# Patient Record
Sex: MALE | Race: BLACK OR AFRICAN AMERICAN | NOT HISPANIC OR LATINO | URBAN - METROPOLITAN AREA
[De-identification: names, ages, dates, MRNs, and addresses within clinical notes are randomized per-mention and may not be internally consistent; named-entity substitution may affect disease eponyms.]

---

## 2017-10-05 ENCOUNTER — EMERGENCY (EMERGENCY)
Facility: HOSPITAL | Age: 32
LOS: 1 days | Discharge: HOME | End: 2017-10-05
Admitting: EMERGENCY MEDICINE
Payer: MEDICAID

## 2017-10-05 VITALS
TEMPERATURE: 99 F | OXYGEN SATURATION: 98 % | HEART RATE: 77 BPM | DIASTOLIC BLOOD PRESSURE: 71 MMHG | WEIGHT: 195.99 LBS | SYSTOLIC BLOOD PRESSURE: 124 MMHG | RESPIRATION RATE: 18 BRPM | HEIGHT: 65 IN
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
## 2017-10-05 DIAGNOSIS — Z23 ENCOUNTER FOR IMMUNIZATION: ICD-10-CM
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
## 2017-10-05 DIAGNOSIS — S61.214A LACERATION WITHOUT FOREIGN BODY OF RIGHT RING FINGER WITHOUT DAMAGE TO NAIL, INITIAL ENCOUNTER: ICD-10-CM

## 2017-10-05 PROCEDURE — 90471 IMMUNIZATION ADMIN: CPT

## 2017-10-05 PROCEDURE — 99053 MED SERV 10PM-8AM 24 HR FAC: CPT

## 2017-10-05 PROCEDURE — 12002 RPR S/N/AX/GEN/TRNK2.6-7.5CM: CPT

## 2017-10-05 PROCEDURE — 90715 TDAP VACCINE 7 YRS/> IM: CPT

## 2017-10-05 PROCEDURE — 73140 X-RAY EXAM OF FINGER(S): CPT

## 2017-10-05 PROCEDURE — 99283 EMERGENCY DEPT VISIT LOW MDM: CPT | Mod: 25

## 2017-10-05 PROCEDURE — 73140 X-RAY EXAM OF FINGER(S): CPT | Mod: 26,RT

## 2017-10-05 RX ORDER — TETANUS TOXOID, REDUCED DIPHTHERIA TOXOID AND ACELLULAR PERTUSSIS VACCINE, ADSORBED 5; 2.5; 8; 8; 2.5 [IU]/.5ML; [IU]/.5ML; UG/.5ML; UG/.5ML; UG/.5ML
0.5 SUSPENSION INTRAMUSCULAR ONCE
Qty: 0 | Refills: 0 | Status: COMPLETED | OUTPATIENT
Start: 2017-10-05 | End: 2017-10-05

## 2017-10-05 RX ADMIN — TETANUS TOXOID, REDUCED DIPHTHERIA TOXOID AND ACELLULAR PERTUSSIS VACCINE, ADSORBED 0.5 MILLILITER(S): 5; 2.5; 8; 8; 2.5 SUSPENSION INTRAMUSCULAR at 00:47

## 2017-10-05 NOTE — ED ADULT TRIAGE NOTE - CHIEF COMPLAINT QUOTE
" accidently cut my finger with glass"   noted 1 inch lac to R hand 4 rd finger, no bleeding  unknown last tetanus vac

## 2017-10-05 NOTE — ED PROVIDER NOTE - PHYSICAL EXAMINATION
2 cm laceration oblique orintation over r 4th pip dorsal aspect ROM fully intact MS 5/5 RUM nerves intact M/s cap refill brisk no fb

## 2017-10-05 NOTE — ED PROCEDURE NOTE - CPROC ED POST PROC CARE GUIDE1
Instructed patient/caregiver to follow-up with primary care physician./Verbal/written post procedure instructions were given to patient/caregiver./Keep the cast/splint/dressing clean and dry./Instructed patient/caregiver regarding signs and symptoms of infection.

## 2017-10-05 NOTE — ED ADULT NURSE NOTE - OBJECTIVE STATEMENT
32 yr old male presents to the ed with c.o lac to 4th digit of right hand. states he cut his finger on glass while washing dishes 30 minutes ago. pt is unaware of last tetanus vaccine. no active bleeding noted. denies any numbness/tingling to finger, or use of blood thinners. no distress noted. <2 cap refill noted.

## 2017-10-15 ENCOUNTER — EMERGENCY (EMERGENCY)
Facility: HOSPITAL | Age: 32
LOS: 1 days | Discharge: HOME | End: 2017-10-15
Admitting: EMERGENCY MEDICINE
Payer: MEDICAID

## 2017-10-15 VITALS
WEIGHT: 195.99 LBS | TEMPERATURE: 98 F | OXYGEN SATURATION: 99 % | HEART RATE: 97 BPM | DIASTOLIC BLOOD PRESSURE: 74 MMHG | RESPIRATION RATE: 16 BRPM | SYSTOLIC BLOOD PRESSURE: 124 MMHG

## 2017-10-15 DIAGNOSIS — S61.214D LACERATION WITHOUT FOREIGN BODY OF RIGHT RING FINGER WITHOUT DAMAGE TO NAIL, SUBSEQUENT ENCOUNTER: ICD-10-CM

## 2017-10-15 PROCEDURE — 99212 OFFICE O/P EST SF 10 MIN: CPT

## 2017-10-15 NOTE — ED PROVIDER NOTE - OBJECTIVE STATEMENT
The pt is a 31 y/o M, who presents to ED for suture removal -- had lac repair on 10/5. Denies pain, pus, bleeding

## 2017-10-15 NOTE — ED ADULT NURSE NOTE - OBJECTIVE STATEMENT
31 y/o male here for suture removal, denies any pain at present time.  right hand and finger  clean and dry, no redness or swelling noted.

## 2019-08-31 NOTE — ED ADULT NURSE NOTE - PRO INTERPRETER NEED 2
[Fever] : fever [Malaise] : malaise [Nasal Congestion] : nasal congestion [Nasal Discharge] : nasal discharge [Ear Pain] : ear pain [Sore Throat] : sore throat [Abdominal Pain] : abdominal pain [Dizziness] : dizziness [Negative] : Genitourinary [Difficulty with Sleep] : no difficulty with sleep [Headache] : no headache [Appetite Changes] : no appetite changes [Vomiting] : no vomiting [Constipation] : no constipation [Diarrhea] : no diarrhea English

## 2022-06-07 NOTE — ED ADULT TRIAGE NOTE - SOURCE OF INFORMATION
----- Message from Wilma Kirk sent at 6/7/2022  1:42 PM CDT -----  Contact: Pt  Type: RX Refill Request  Who Called:Pt  Refill or New RX:Refill  RX Name and Strength:buPROPion (WELLBUTRIN) 100 MG tablet  rosuvastatin (CRESTOR) 10 MG tablet  How is the patient currently taking it: As Directed  Is this a 30 or 90 day : as Directed  Preferred Pharmacy/Phone Number:  [id number] - [place] - [address] [facility] [address]  [address] [address]  Phone: [phone] Fax: [phone]      Best Call Back Number:[phone]    Additional Information :N/A        Patient
